# Patient Record
Sex: FEMALE | Race: WHITE | NOT HISPANIC OR LATINO
[De-identification: names, ages, dates, MRNs, and addresses within clinical notes are randomized per-mention and may not be internally consistent; named-entity substitution may affect disease eponyms.]

---

## 2017-11-28 PROBLEM — Z00.00 ENCOUNTER FOR PREVENTIVE HEALTH EXAMINATION: Status: ACTIVE | Noted: 2017-11-28

## 2018-01-03 ENCOUNTER — APPOINTMENT (OUTPATIENT)
Dept: ORTHOPEDIC SURGERY | Facility: CLINIC | Age: 73
End: 2018-01-03
Payer: MEDICARE

## 2018-01-03 VITALS
HEART RATE: 83 BPM | DIASTOLIC BLOOD PRESSURE: 81 MMHG | HEIGHT: 62 IN | BODY MASS INDEX: 27.6 KG/M2 | SYSTOLIC BLOOD PRESSURE: 169 MMHG | WEIGHT: 150 LBS

## 2018-01-03 DIAGNOSIS — M25.551 PAIN IN RIGHT HIP: ICD-10-CM

## 2018-01-03 DIAGNOSIS — M51.26 OTHER INTERVERTEBRAL DISC DISPLACEMENT, LUMBAR REGION: ICD-10-CM

## 2018-01-03 DIAGNOSIS — M54.5 LOW BACK PAIN: ICD-10-CM

## 2018-01-03 PROCEDURE — 72170 X-RAY EXAM OF PELVIS: CPT

## 2018-01-03 PROCEDURE — 72100 X-RAY EXAM L-S SPINE 2/3 VWS: CPT

## 2018-01-03 PROCEDURE — 99202 OFFICE O/P NEW SF 15 MIN: CPT

## 2018-01-18 ENCOUNTER — APPOINTMENT (OUTPATIENT)
Dept: OBGYN | Facility: CLINIC | Age: 73
End: 2018-01-18
Payer: MEDICARE

## 2018-01-18 VITALS
WEIGHT: 148 LBS | DIASTOLIC BLOOD PRESSURE: 92 MMHG | HEIGHT: 62 IN | BODY MASS INDEX: 27.23 KG/M2 | SYSTOLIC BLOOD PRESSURE: 179 MMHG

## 2018-01-18 DIAGNOSIS — E78.5 HYPERLIPIDEMIA, UNSPECIFIED: ICD-10-CM

## 2018-01-18 DIAGNOSIS — I10 ESSENTIAL (PRIMARY) HYPERTENSION: ICD-10-CM

## 2018-01-18 PROCEDURE — G0101: CPT

## 2018-01-18 RX ORDER — AMOXICILLIN 500 MG/1
500 CAPSULE ORAL
Qty: 28 | Refills: 0 | Status: COMPLETED | COMMUNITY
Start: 2017-08-15

## 2018-01-18 RX ORDER — TRETINOIN 0.25 MG/G
0.03 CREAM TOPICAL
Qty: 45 | Refills: 0 | Status: COMPLETED | COMMUNITY
Start: 2017-12-04

## 2018-01-18 RX ORDER — CHOLECALCIFEROL (VITAMIN D3) 25 MCG
TABLET ORAL
Refills: 0 | Status: COMPLETED | COMMUNITY

## 2018-01-18 RX ORDER — DICLOFENAC SODIUM 10 MG/G
1 GEL TOPICAL
Qty: 100 | Refills: 0 | Status: ACTIVE | COMMUNITY
Start: 2017-08-09

## 2018-01-18 RX ORDER — BENZONATATE 200 MG/1
200 CAPSULE ORAL
Qty: 20 | Refills: 0 | Status: ACTIVE | COMMUNITY
Start: 2017-10-22

## 2018-01-18 RX ORDER — LOSARTAN POTASSIUM AND HYDROCHLOROTHIAZIDE 12.5; 1 MG/1; MG/1
TABLET ORAL
Refills: 0 | Status: ACTIVE | COMMUNITY

## 2018-01-18 RX ORDER — ASPIRIN 81 MG
81 TABLET, DELAYED RELEASE (ENTERIC COATED) ORAL
Refills: 0 | Status: ACTIVE | COMMUNITY

## 2018-01-18 RX ORDER — ATORVASTATIN CALCIUM 80 MG/1
TABLET, FILM COATED ORAL
Refills: 0 | Status: ACTIVE | COMMUNITY

## 2018-01-18 RX ORDER — NEOMYCIN AND POLYMYXIN B SULFATES AND DEXAMETHASONE 3.5; 10000; 1 MG/G; [IU]/G; MG/G
3.5-10000-0.1 OINTMENT OPHTHALMIC
Qty: 4 | Refills: 0 | Status: COMPLETED | COMMUNITY
Start: 2017-08-18

## 2018-01-26 LAB — CYTOLOGY CVX/VAG DOC THIN PREP: NORMAL

## 2018-02-07 ENCOUNTER — APPOINTMENT (OUTPATIENT)
Dept: UROGYNECOLOGY | Facility: CLINIC | Age: 73
End: 2018-02-07
Payer: MEDICARE

## 2018-02-07 VITALS — WEIGHT: 150 LBS | BODY MASS INDEX: 28.32 KG/M2 | HEIGHT: 61 IN | HEART RATE: 80 BPM

## 2018-02-07 DIAGNOSIS — Z78.9 OTHER SPECIFIED HEALTH STATUS: ICD-10-CM

## 2018-02-07 DIAGNOSIS — Z83.49 FAMILY HISTORY OF OTHER ENDOCRINE, NUTRITIONAL AND METABOLIC DISEASES: ICD-10-CM

## 2018-02-07 DIAGNOSIS — Z82.49 FAMILY HISTORY OF ISCHEMIC HEART DISEASE AND OTHER DISEASES OF THE CIRCULATORY SYSTEM: ICD-10-CM

## 2018-02-07 DIAGNOSIS — Z37.9 ENCOUNTER FOR FULL-TERM UNCOMPLICATED DELIVERY: ICD-10-CM

## 2018-02-07 LAB
BILIRUB UR QL STRIP: NORMAL
CLARITY UR: NORMAL
COLLECTION METHOD: NORMAL
GLUCOSE UR-MCNC: NORMAL
HCG UR QL: 0.2 EU/DL
HGB UR QL STRIP.AUTO: NORMAL
KETONES UR-MCNC: NORMAL
LEUKOCYTE ESTERASE UR QL STRIP: NORMAL
NITRITE UR QL STRIP: NORMAL
PH UR STRIP: 6
PROT UR STRIP-MCNC: NORMAL
SP GR UR STRIP: 1

## 2018-02-07 PROCEDURE — 81003 URINALYSIS AUTO W/O SCOPE: CPT | Mod: QW

## 2018-02-07 PROCEDURE — 99204 OFFICE O/P NEW MOD 45 MIN: CPT | Mod: 25

## 2018-02-07 RX ORDER — CHOLECALCIFEROL (VITAMIN D3) 25 MCG
TABLET ORAL
Refills: 0 | Status: ACTIVE | COMMUNITY

## 2018-02-08 ENCOUNTER — RESULT REVIEW (OUTPATIENT)
Age: 73
End: 2018-02-08

## 2018-02-08 LAB
APPEARANCE: CLEAR
BACTERIA: NEGATIVE
BILIRUBIN URINE: NEGATIVE
BLOOD URINE: ABNORMAL
COLOR: YELLOW
GLUCOSE QUALITATIVE U: NEGATIVE MG/DL
KETONES URINE: NEGATIVE
LEUKOCYTE ESTERASE URINE: NEGATIVE
MICROSCOPIC-UA: NORMAL
NITRITE URINE: NEGATIVE
PH URINE: 6
PROTEIN URINE: NEGATIVE MG/DL
RED BLOOD CELLS URINE: 1 /HPF
SPECIFIC GRAVITY URINE: 1.01
SQUAMOUS EPITHELIAL CELLS: 0 /HPF
UROBILINOGEN URINE: NEGATIVE MG/DL
WHITE BLOOD CELLS URINE: 0 /HPF

## 2018-02-09 ENCOUNTER — RESULT REVIEW (OUTPATIENT)
Age: 73
End: 2018-02-09

## 2018-02-09 LAB — BACTERIA UR CULT: NORMAL

## 2018-04-04 ENCOUNTER — APPOINTMENT (OUTPATIENT)
Dept: ORTHOPEDIC SURGERY | Facility: CLINIC | Age: 73
End: 2018-04-04
Payer: MEDICARE

## 2018-04-04 PROCEDURE — 99213 OFFICE O/P EST LOW 20 MIN: CPT | Mod: 25

## 2018-04-04 PROCEDURE — 20610 DRAIN/INJ JOINT/BURSA W/O US: CPT | Mod: RT

## 2018-08-13 ENCOUNTER — APPOINTMENT (OUTPATIENT)
Dept: UROGYNECOLOGY | Facility: CLINIC | Age: 73
End: 2018-08-13
Payer: MEDICARE

## 2018-08-13 DIAGNOSIS — N81.89 OTHER FEMALE GENITAL PROLAPSE: ICD-10-CM

## 2018-08-13 LAB
BILIRUB UR QL STRIP: NORMAL
CLARITY UR: CLEAR
COLLECTION METHOD: NORMAL
GLUCOSE UR-MCNC: NORMAL
HCG UR QL: 0.2 EU/DL
HGB UR QL STRIP.AUTO: NORMAL
KETONES UR-MCNC: NORMAL
LEUKOCYTE ESTERASE UR QL STRIP: NORMAL
NITRITE UR QL STRIP: NORMAL
PH UR STRIP: 5
PROT UR STRIP-MCNC: NORMAL
SP GR UR STRIP: 1

## 2018-08-13 PROCEDURE — 99214 OFFICE O/P EST MOD 30 MIN: CPT | Mod: 25

## 2018-08-13 PROCEDURE — 51701 INSERT BLADDER CATHETER: CPT

## 2018-08-13 PROCEDURE — 81003 URINALYSIS AUTO W/O SCOPE: CPT | Mod: QW

## 2018-08-14 LAB
APPEARANCE: CLEAR
BACTERIA: NEGATIVE
BILIRUBIN URINE: NEGATIVE
BLOOD URINE: ABNORMAL
COLOR: YELLOW
GLUCOSE QUALITATIVE U: NEGATIVE MG/DL
KETONES URINE: NEGATIVE
LEUKOCYTE ESTERASE URINE: NEGATIVE
MICROSCOPIC-UA: NORMAL
NITRITE URINE: NEGATIVE
PH URINE: 5.5
PROTEIN URINE: NEGATIVE MG/DL
RED BLOOD CELLS URINE: 0 /HPF
SPECIFIC GRAVITY URINE: 1.01
SQUAMOUS EPITHELIAL CELLS: 0 /HPF
UROBILINOGEN URINE: NEGATIVE MG/DL
WHITE BLOOD CELLS URINE: 0 /HPF

## 2018-08-15 ENCOUNTER — RESULT REVIEW (OUTPATIENT)
Age: 73
End: 2018-08-15

## 2018-08-15 LAB — BACTERIA UR CULT: NORMAL

## 2018-10-15 ENCOUNTER — APPOINTMENT (OUTPATIENT)
Dept: UROGYNECOLOGY | Facility: CLINIC | Age: 73
End: 2018-10-15

## 2019-09-16 ENCOUNTER — APPOINTMENT (OUTPATIENT)
Dept: UROGYNECOLOGY | Facility: CLINIC | Age: 74
End: 2019-09-16
Payer: MEDICARE

## 2019-09-16 LAB
BILIRUB UR QL STRIP: NORMAL
CLARITY UR: CLEAR
COLLECTION METHOD: NORMAL
GLUCOSE UR-MCNC: NORMAL
HCG UR QL: 0.2 EU/DL
HGB UR QL STRIP.AUTO: NORMAL
KETONES UR-MCNC: NORMAL
LEUKOCYTE ESTERASE UR QL STRIP: NORMAL
NITRITE UR QL STRIP: NORMAL
PH UR STRIP: 5
PROT UR STRIP-MCNC: NORMAL
SP GR UR STRIP: 1.01

## 2019-09-16 PROCEDURE — 51701 INSERT BLADDER CATHETER: CPT

## 2019-09-16 PROCEDURE — 81003 URINALYSIS AUTO W/O SCOPE: CPT | Mod: NC,QW

## 2019-09-16 PROCEDURE — 99214 OFFICE O/P EST MOD 30 MIN: CPT | Mod: 25

## 2019-09-16 NOTE — DISCUSSION/SUMMARY
[FreeTextEntry1] : I reviewed the above findings with the patient and her  with visual illustrations. For the postmenopausal bleeding we discussed going for a pelvic ultrasound. Treatment options for the prolapse were discussed and included doing nothing, Kegel exercises and behavioral modification, a pessary, or surgical correction.Surgically we discussed the abdominal vs the vaginal routes. Abdominally we discussed a hysterectomy and a sacral colpopexy   laparoscopically and robotically.  Vaginally we discussed a vaginal hysterectomy, uterosacral suspension, and anterior/posterior repair. Surgically we discussed hysteropexy as well as the use of biologics. She is interested in surgical correction and have asked her to return for urodynamic testing to further evaluate her urinary complaints. We will further discuss treatment options after her evaluation is completed. All questions were answered. IUGA patient information on pelvic organ prolapse was given to her. \par

## 2019-09-16 NOTE — HISTORY OF PRESENT ILLNESS
[Uterine Prolapse] : daily [Rectal Prolapse] : daily [Unable To Restrain Bowel Movement] : rare [x1] : once nightly [Urinary Frequency] : none [Urinary Tract Infection] : daily [FreeTextEntry4] : see below [FreeTextEntry1] : pt had an episode of vaginal bleeding 4 days ago.  \par pt has been doing Kegels daily.  \par she would also like to discuss tx options for POP.

## 2019-09-16 NOTE — PHYSICAL EXAM
[No Acute Distress] : in no acute distress [Well developed] : well developed [Well Nourished] : ~L well nourished [Oriented x3] : oriented to person, place, and time [Soft, Nontender] : the abdomen was soft and nontender [Warm and Dry] : was warm and dry to touch [Normal Appearance] : general appearance was normal [Atrophy] : atrophy [Cystocele] : a cystocele [Uterine Prolapse] : uterine prolapse [Normal] : normal [Uterine Adnexae] : were not tender and not enlarged [Normal rectal exam] : was normal [Post Void Residual ____ml] : post void residual via catheterization was [unfilled] ml [de-identified] : no change from 8/13/2018

## 2019-09-17 ENCOUNTER — RESULT REVIEW (OUTPATIENT)
Age: 74
End: 2019-09-17

## 2019-09-17 LAB
APPEARANCE: CLEAR
BACTERIA: NEGATIVE
BILIRUBIN URINE: NEGATIVE
BLOOD URINE: ABNORMAL
COLOR: NORMAL
GLUCOSE QUALITATIVE U: NEGATIVE
HYALINE CASTS: 0 /LPF
KETONES URINE: NEGATIVE
LEUKOCYTE ESTERASE URINE: NEGATIVE
MICROSCOPIC-UA: NORMAL
NITRITE URINE: NEGATIVE
PH URINE: 5.5
PROTEIN URINE: NEGATIVE
RED BLOOD CELLS URINE: 1 /HPF
SPECIFIC GRAVITY URINE: 1.01
SQUAMOUS EPITHELIAL CELLS: 0 /HPF
UROBILINOGEN URINE: NORMAL
WHITE BLOOD CELLS URINE: 0 /HPF

## 2019-09-18 ENCOUNTER — RESULT REVIEW (OUTPATIENT)
Age: 74
End: 2019-09-18

## 2019-09-18 LAB — BACTERIA UR CULT: NORMAL

## 2019-09-24 ENCOUNTER — FORM ENCOUNTER (OUTPATIENT)
Age: 74
End: 2019-09-24

## 2019-09-25 ENCOUNTER — MESSAGE (OUTPATIENT)
Age: 74
End: 2019-09-25

## 2019-09-25 ENCOUNTER — APPOINTMENT (OUTPATIENT)
Dept: ULTRASOUND IMAGING | Facility: CLINIC | Age: 74
End: 2019-09-25
Payer: MEDICARE

## 2019-09-25 ENCOUNTER — OUTPATIENT (OUTPATIENT)
Dept: OUTPATIENT SERVICES | Facility: HOSPITAL | Age: 74
LOS: 1 days | End: 2019-09-25
Payer: MEDICARE

## 2019-09-25 DIAGNOSIS — N95.0 POSTMENOPAUSAL BLEEDING: ICD-10-CM

## 2019-09-25 PROCEDURE — 76830 TRANSVAGINAL US NON-OB: CPT

## 2019-09-25 PROCEDURE — 76830 TRANSVAGINAL US NON-OB: CPT | Mod: 26

## 2019-09-30 ENCOUNTER — APPOINTMENT (OUTPATIENT)
Dept: UROGYNECOLOGY | Facility: CLINIC | Age: 74
End: 2019-09-30
Payer: MEDICARE

## 2019-09-30 ENCOUNTER — OUTPATIENT (OUTPATIENT)
Dept: OUTPATIENT SERVICES | Facility: HOSPITAL | Age: 74
LOS: 1 days | End: 2019-09-30
Payer: MEDICARE

## 2019-09-30 DIAGNOSIS — Z01.818 ENCOUNTER FOR OTHER PREPROCEDURAL EXAMINATION: ICD-10-CM

## 2019-09-30 PROCEDURE — 51797 INTRAABDOMINAL PRESSURE TEST: CPT

## 2019-09-30 PROCEDURE — 51729 CYSTOMETROGRAM W/VP&UP: CPT

## 2019-09-30 PROCEDURE — 51797 INTRAABDOMINAL PRESSURE TEST: CPT | Mod: 26

## 2019-09-30 PROCEDURE — 51784 ANAL/URINARY MUSCLE STUDY: CPT

## 2019-09-30 PROCEDURE — 51784 ANAL/URINARY MUSCLE STUDY: CPT | Mod: 26

## 2019-09-30 PROCEDURE — 51729 CYSTOMETROGRAM W/VP&UP: CPT | Mod: 26

## 2019-10-07 ENCOUNTER — APPOINTMENT (OUTPATIENT)
Dept: UROGYNECOLOGY | Facility: CLINIC | Age: 74
End: 2019-10-07
Payer: MEDICARE

## 2019-10-07 PROCEDURE — 99214 OFFICE O/P EST MOD 30 MIN: CPT

## 2019-10-07 NOTE — HISTORY OF PRESENT ILLNESS
[FreeTextEntry1] : Patient returns today with her  for followup on her pelvic organ prolapse and urinary incontinence. Her chart was reviewed. Review of symptoms was unchanged from her visit dated September 16, 2019. She does not experience episodes of stress incontinence. On her last visit she had a pop to stage II pelvic organ prolapse with uterovaginal prolapse and cystocele. She underwent urodynamic testing in September which revealed occult stress urinary incontinence. She had a pelvic ultrasound in September which revealed a uterus 6.4 x 4.4 x 4.9 cm with a 2 cm fibroid endometrial lining was 1 mm ovaries were normal. I reviewed these findings with them. I reviewed the above findings with the patient with visual illustrations. Treatment options for the prolapse were discussed and included doing nothing, Kegel exercises and behavioral modification, a pessary, or surgical correction.Surgically we discussed the abdominal vs the vaginal routes. Abdominally we discussed a hysterectomy and a sacral colpopexy   laparoscopically and robotically.  Vaginally we discussed a vaginal hysterectomy, uterosacral suspension, and anterior/posterior repair. Surgically we discussed hysteropexy as well. We discussed the occult stress incontinence as well as surgical and nonsurgical treatment options and the placement of a mid urethral sling at the time of surgical correction. She wishes to proceed with a needed tissue repair vaginally and we discussed a vaginal hysterectomy, uterosacral suspension, anterior and possible posterior repair and I UG date patient information on such was given to her. She does not wish to proceed with a mid urethral sling at the time of surgical correction she understands that there is a possibility of her developing symptomatic stress urinary incontinence postoperatively for which she can request treatment including surgical correction. We discussed hospital stay. We discussed the possibility of going home with a catheter a failure. All questions were answered and she wishes to schedule surgery for some time after the holidays in December or early January. All questions were answered\par

## 2019-12-13 ENCOUNTER — APPOINTMENT (OUTPATIENT)
Dept: ORTHOPEDIC SURGERY | Facility: CLINIC | Age: 74
End: 2019-12-13
Payer: MEDICARE

## 2019-12-13 VITALS — WEIGHT: 150 LBS | BODY MASS INDEX: 28.32 KG/M2 | HEIGHT: 61 IN

## 2019-12-13 DIAGNOSIS — M70.61 TROCHANTERIC BURSITIS, RIGHT HIP: ICD-10-CM

## 2019-12-13 DIAGNOSIS — Z96.641 PRESENCE OF RIGHT ARTIFICIAL HIP JOINT: ICD-10-CM

## 2019-12-13 PROCEDURE — 72170 X-RAY EXAM OF PELVIS: CPT

## 2019-12-13 PROCEDURE — 20610 DRAIN/INJ JOINT/BURSA W/O US: CPT | Mod: RT

## 2019-12-13 PROCEDURE — 99213 OFFICE O/P EST LOW 20 MIN: CPT | Mod: 25

## 2019-12-13 NOTE — PHYSICAL EXAM
[de-identified] : An AP of the pelvis with a lateral of the patient's right hip shows a right Biomet all porous total hip replacement.  It is in good position.  It is well fixed.  There is no evidence of ostial lysis by x-ray.  There is no calcium over the greater trochanter but this does not rule out trochanteric bursitis.  The pelvis and a lateral of the right hip shows a Biomet porous hip replacement in good position and well fixed. There is no evidence of any localized osteolysis. There is no evidence of her by x-ray.\par \par  [FreeTextEntry2] : \par \par HIP EXAMINATION\par Is symmetric she has flexion of 135 abduction 70 adduction 30 external rotation's 65 internal rotation 20 and full extension.  She does have pain directly over her right greater trochanter the clinically this is consistent with trochanteric bursitis.  It hurts her when she lays on it hurts when she is walking.\par

## 2019-12-13 NOTE — DISCUSSION/SUMMARY
[de-identified] : Her hip is doing very well.  She is walking well is no problem as far as her total hip replacement she does have pain over the greater trochanter which is consistent with trochanteric bursitis.  Following the local injection the patient had no pain no pain when laying on it no pain with palpation over the greater trochanter she will return in 3 months PRN for her trochanteric bursitis if it recurs otherwise a year.

## 2019-12-13 NOTE — HISTORY OF PRESENT ILLNESS
[de-identified] : Pt is a 75 y/o female s/p R THR 11-12 years ago. \par Patient received right hip GT bursa injection when last in office April 2018, which helped for over 1-1/2 years.\par She now comes back again and is having some pain directly over the right greater trochanter. This started gradually over the last month.\par Pain felt mostly with getting up from a seated position. Pain relieved mildly to moderately with Aleve.

## 2020-01-06 ENCOUNTER — APPOINTMENT (OUTPATIENT)
Dept: UROGYNECOLOGY | Facility: CLINIC | Age: 75
End: 2020-01-06
Payer: MEDICARE

## 2020-01-06 PROCEDURE — 51701 INSERT BLADDER CATHETER: CPT

## 2020-01-06 PROCEDURE — 99214 OFFICE O/P EST MOD 30 MIN: CPT | Mod: 25

## 2020-01-06 NOTE — PROCEDURE
[FreeTextEntry1] : A catheterized urine was performed to rule out urinary tract infection as due to prolapse  patient is unlikely to be able to give an adequate clean-catch specimen.\par

## 2020-01-06 NOTE — PHYSICAL EXAM
[No Acute Distress] : in no acute distress [Well developed] : well developed [Well Nourished] : ~L well nourished [Normal Mood/Affect] : mood and affect are normal [Soft, Nontender] : the abdomen was soft and nontender [Vulvar Atrophy] : vulvar atrophy [Normal Appearance] : general appearance was normal [Cystocele] : a cystocele [Atrophy] : atrophy [Uterine Prolapse] : uterine prolapse [Normal] : normal [Uterine Adnexae] : were not tender and not enlarged [Anxiety] : patient is not anxious [de-identified] : 2mm right labial skin tag [de-identified] : no change

## 2020-01-06 NOTE — HISTORY OF PRESENT ILLNESS
[FreeTextEntry1] : Patient returns today with her  for followup on her pelvic organ prolapse and urinary incontinence. Her chart was reviewed. Review of symptoms was unchanged from her visit dated September 16, 2019. On her last visit she had a pop to stage II pelvic organ prolapse with uterovaginal prolapse and cystocele. She underwent urodynamic testing in September which revealed occult stress urinary incontinence. She had a pelvic ultrasound in September which revealed a uterus 6.4 x 4.4 x 4.9 cm with a 2 cm fibroid, endometrial lining was 1 mm ovaries were normal.\par She has a right labial skin tag which has been bothersome and would like removed.

## 2020-01-08 ENCOUNTER — RESULT REVIEW (OUTPATIENT)
Age: 75
End: 2020-01-08

## 2020-01-08 LAB — BACTERIA UR CULT: NORMAL

## 2020-01-15 ENCOUNTER — OUTPATIENT (OUTPATIENT)
Dept: OUTPATIENT SERVICES | Facility: HOSPITAL | Age: 75
LOS: 1 days | End: 2020-01-15
Payer: MEDICARE

## 2020-01-15 VITALS
RESPIRATION RATE: 16 BRPM | TEMPERATURE: 99 F | DIASTOLIC BLOOD PRESSURE: 87 MMHG | HEIGHT: 61 IN | OXYGEN SATURATION: 97 % | SYSTOLIC BLOOD PRESSURE: 157 MMHG | HEART RATE: 68 BPM | WEIGHT: 134.92 LBS

## 2020-01-15 DIAGNOSIS — N81.11 CYSTOCELE, MIDLINE: ICD-10-CM

## 2020-01-15 DIAGNOSIS — N81.2 INCOMPLETE UTEROVAGINAL PROLAPSE: ICD-10-CM

## 2020-01-15 DIAGNOSIS — Z98.41 CATARACT EXTRACTION STATUS, RIGHT EYE: Chronic | ICD-10-CM

## 2020-01-15 DIAGNOSIS — Z01.818 ENCOUNTER FOR OTHER PREPROCEDURAL EXAMINATION: ICD-10-CM

## 2020-01-15 DIAGNOSIS — I10 ESSENTIAL (PRIMARY) HYPERTENSION: ICD-10-CM

## 2020-01-15 DIAGNOSIS — Z98.890 OTHER SPECIFIED POSTPROCEDURAL STATES: Chronic | ICD-10-CM

## 2020-01-15 DIAGNOSIS — Z96.641 PRESENCE OF RIGHT ARTIFICIAL HIP JOINT: Chronic | ICD-10-CM

## 2020-01-15 DIAGNOSIS — Z29.9 ENCOUNTER FOR PROPHYLACTIC MEASURES, UNSPECIFIED: ICD-10-CM

## 2020-01-15 LAB
ANION GAP SERPL CALC-SCNC: 15 MMOL/L — SIGNIFICANT CHANGE UP (ref 5–17)
BLD GP AB SCN SERPL QL: NEGATIVE — SIGNIFICANT CHANGE UP
BUN SERPL-MCNC: 19 MG/DL — SIGNIFICANT CHANGE UP (ref 7–23)
CALCIUM SERPL-MCNC: 9.8 MG/DL — SIGNIFICANT CHANGE UP (ref 8.4–10.5)
CHLORIDE SERPL-SCNC: 100 MMOL/L — SIGNIFICANT CHANGE UP (ref 96–108)
CO2 SERPL-SCNC: 27 MMOL/L — SIGNIFICANT CHANGE UP (ref 22–31)
CREAT SERPL-MCNC: 0.93 MG/DL — SIGNIFICANT CHANGE UP (ref 0.5–1.3)
GLUCOSE SERPL-MCNC: 93 MG/DL — SIGNIFICANT CHANGE UP (ref 70–99)
HBA1C BLD-MCNC: 5.7 % — HIGH (ref 4–5.6)
HCT VFR BLD CALC: 45.3 % — HIGH (ref 34.5–45)
HGB BLD-MCNC: 14.2 G/DL — SIGNIFICANT CHANGE UP (ref 11.5–15.5)
MCHC RBC-ENTMCNC: 30.1 PG — SIGNIFICANT CHANGE UP (ref 27–34)
MCHC RBC-ENTMCNC: 31.3 GM/DL — LOW (ref 32–36)
MCV RBC AUTO: 96 FL — SIGNIFICANT CHANGE UP (ref 80–100)
PLATELET # BLD AUTO: 237 K/UL — SIGNIFICANT CHANGE UP (ref 150–400)
POTASSIUM SERPL-MCNC: 3.9 MMOL/L — SIGNIFICANT CHANGE UP (ref 3.5–5.3)
POTASSIUM SERPL-SCNC: 3.9 MMOL/L — SIGNIFICANT CHANGE UP (ref 3.5–5.3)
RBC # BLD: 4.72 M/UL — SIGNIFICANT CHANGE UP (ref 3.8–5.2)
RBC # FLD: 12.8 % — SIGNIFICANT CHANGE UP (ref 10.3–14.5)
RH IG SCN BLD-IMP: POSITIVE — SIGNIFICANT CHANGE UP
SODIUM SERPL-SCNC: 142 MMOL/L — SIGNIFICANT CHANGE UP (ref 135–145)
WBC # BLD: 6.46 K/UL — SIGNIFICANT CHANGE UP (ref 3.8–10.5)
WBC # FLD AUTO: 6.46 K/UL — SIGNIFICANT CHANGE UP (ref 3.8–10.5)

## 2020-01-15 PROCEDURE — 85027 COMPLETE CBC AUTOMATED: CPT

## 2020-01-15 PROCEDURE — 80048 BASIC METABOLIC PNL TOTAL CA: CPT

## 2020-01-15 PROCEDURE — 86901 BLOOD TYPING SEROLOGIC RH(D): CPT

## 2020-01-15 PROCEDURE — 86900 BLOOD TYPING SEROLOGIC ABO: CPT

## 2020-01-15 PROCEDURE — 83036 HEMOGLOBIN GLYCOSYLATED A1C: CPT

## 2020-01-15 PROCEDURE — G0463: CPT

## 2020-01-15 PROCEDURE — 86850 RBC ANTIBODY SCREEN: CPT

## 2020-01-15 RX ORDER — CEFOTETAN DISODIUM 1 G
2 VIAL (EA) INJECTION ONCE
Refills: 0 | Status: DISCONTINUED | OUTPATIENT
Start: 2020-01-28 | End: 2020-01-29

## 2020-01-15 NOTE — H&P PST ADULT - NEGATIVE CARDIOVASCULAR SYMPTOMS
no chest pain/no orthopnea/no palpitations/no dyspnea on exertion/no paroxysmal nocturnal dyspnea/no peripheral edema

## 2020-01-15 NOTE — H&P PST ADULT - NSICDXPROBLEM_GEN_ALL_CORE_FT
PROBLEM DIAGNOSES  Problem: Incomplete uterovaginal prolapse  Assessment and Plan: Cystoscopy, anterior and posterior repair with enterocele, total vaginal hysterectomy  , uterosacral suspension  check CBC, BMP, HgBA1c, T&S  Continue ASA as ordered by PMD    Problem: HTN (hypertension)  Assessment and Plan: Continue meds as ordered     Problem: Prophylactic measure  Assessment and Plan: The Caprini score indicates this patient is at risk for a VTE event (score 3-5).  Most surgical patients in this group would benefit from pharmacologic prophylaxis.  The surgical team will determine the balance between VTE risk and bleeding risk

## 2020-01-15 NOTE — H&P PST ADULT - NSICDXPASTSURGICALHX_GEN_ALL_CORE_FT
PAST SURGICAL HISTORY:  H/O meniscectomy of right knee     S/P bilateral cataract extraction     S/P hip replacement, right 3/9/07

## 2020-01-15 NOTE — H&P PST ADULT - NEGATIVE NEUROLOGICAL SYMPTOMS
no syncope/no paresthesias/no vertigo/no headache/no facial palsy/no hemiparesis/no focal seizures/no tremors/no confusion/no loss of consciousness/no loss of sensation/no weakness/no generalized seizures

## 2020-01-15 NOTE — H&P PST ADULT - NEGATIVE GENERAL GENITOURINARY SYMPTOMS
no flank pain L/no hematuria/no renal colic/no dysuria/no incontinence/no flank pain R/no bladder infections

## 2020-01-15 NOTE — H&P PST ADULT - NSICDXPASTMEDICALHX_GEN_ALL_CORE_FT
PAST MEDICAL HISTORY:  Displacement of lumbar intervertebral disc     Female stress incontinence     Greater trochanteric bursitis, right     History of uterine fibroid     HLD (hyperlipidemia)     HTN (hypertension)     Incomplete uterovaginal prolapse     Midline cystocele     Pelvic floor weakness     Urethral hypermobility     Urinary urgency

## 2020-01-15 NOTE — H&P PST ADULT - NSANTHOSAYNRD_GEN_A_CORE
No. POLLY screening performed.  STOP BANG Legend: 0-2 = LOW Risk; 3-4 = INTERMEDIATE Risk; 5-8 = HIGH Risk

## 2020-01-15 NOTE — H&P PST ADULT - HISTORY OF PRESENT ILLNESS
73 Y/O Female H/O HTN, HLD, OA, Incomplete uterovaginal prolapse, cystocele midline. C/O vaginal bulge.  S/P vaginal ultrasound. Presents cystoscopy, anterior and posterior repair with enterocele, total vaginal hysterectomy , uterosacral suspension 1/28/20. 73 Y/O Female H/O HTN, HLD, OA, Incomplete uterovaginal prolapse, cystocele midline, uterine fibrois. C/O vaginal bulge, stress incontinence, urinary urgency.   Seen by Dr Plunkett. S/P Pelvic ultrasound. Presents cystoscopy, anterior and posterior repair with enterocele, total vaginal hysterectomy , uterosacral suspension 1/28/20. 75 Y/O Female H/O HTN, HLD, OA, Incomplete uterovaginal prolapse, cystocele midline, uterine fibroid. C/O vaginal bulge, stress incontinence, urinary urgency.   Seen by Dr Plunkett. S/P Pelvic ultrasound. Presents cystoscopy, anterior and posterior repair with enterocele, total vaginal hysterectomy , uterosacral suspension 1/28/20.

## 2020-01-15 NOTE — H&P PST ADULT - ASSESSMENT
JINI VTE 2.0 SCORE [CLOT updated 2019]    AGE RELATED RISK FACTORS                                                       MOBILITY RELATED FACTORS  [ ] Age 41-60 years                                            (1 Point)                    [ ] Bed rest                                                        (1 Point)  x[ ] Age: 61-74 years                                           (2 Points)                  [ ] Plaster cast                                                   (2 Points)  [ ] Age= 75 years                                              (3 Points)                    [ ] Bed bound for more than 72 hours                 (2 Points)    DISEASE RELATED RISK FACTORS                                               GENDER SPECIFIC FACTORS  [ ] Edema in the lower extremities                       (1 Point)              [ ] Pregnancy                                                     (1 Point)  [ ] Varicose veins                                               (1 Point)                     [ ] Post-partum < 6 weeks                                   (1 Point)             [x ] BMI > 25 Kg/m2                                            (1 Point)                     [ ] Hormonal therapy  or oral contraception          (1 Point)                 [ ] Sepsis (in the previous month)                        (1 Point)               [ ] History of pregnancy complications                 (1 point)  [ ] Pneumonia or serious lung disease                                               [ ] Unexplained or recurrent                     (1 Point)           (in the previous month)                               (1 Point)  [ ] Abnormal pulmonary function test                     (1 Point)                 SURGERY RELATED RISK FACTORS  [ ] Acute myocardial infarction                              (1 Point)               [ ]  Section                                             (1 Point)  [ ] Congestive heart failure (in the previous month)  (1 Point)      [ ] Minor surgery                                                  (1 Point)   [ ] Inflammatory bowel disease                             (1 Point)               [ ] Arthroscopic surgery                                        (2 Points)  [ ] Central venous access                                      (2 Points)                [x ] General surgery lasting more than 45 minutes (2 points)  [ ] Malignancy- Present or previous                   (2 Points)                [ ] Elective arthroplasty                                         (5 points)    [ ] Stroke (in the previous month)                          (5 Points)                                                                                                                                                           HEMATOLOGY RELATED FACTORS                                                 TRAUMA RELATED RISK FACTORS  [ ] Prior episodes of VTE                                     (3 Points)                [ ] Fracture of the hip, pelvis, or leg                       (5 Points)  [ ] Positive family history for VTE                         (3 Points)             [ ] Acute spinal cord injury (in the previous month)  (5 Points)  [ ] Prothrombin 31347 A                                     (3 Points)               [ ] Paralysis  (less than 1 month)                             (5 Points)  [ ] Factor V Leiden                                             (3 Points)                  [ ] Multiple Trauma within 1 month                        (5 Points)  [ ] Lupus anticoagulants                                     (3 Points)                                                           [ ] Anticardiolipin antibodies                               (3 Points)                                                       [ ] High homocysteine in the blood                      (3 Points)                                             [ ] Other congenital or acquired thrombophilia      (3 Points)                                                [ ] Heparin induced thrombocytopenia                  (3 Points)                                     Total Score [    5      ]

## 2020-01-27 ENCOUNTER — TRANSCRIPTION ENCOUNTER (OUTPATIENT)
Age: 75
End: 2020-01-27

## 2020-01-28 ENCOUNTER — INPATIENT (INPATIENT)
Facility: HOSPITAL | Age: 75
LOS: 0 days | Discharge: HOME | DRG: 743 | End: 2020-01-29
Attending: UROLOGY | Admitting: UROLOGY
Payer: MEDICARE

## 2020-01-28 ENCOUNTER — TRANSCRIPTION ENCOUNTER (OUTPATIENT)
Age: 75
End: 2020-01-28

## 2020-01-28 ENCOUNTER — APPOINTMENT (OUTPATIENT)
Dept: UROGYNECOLOGY | Facility: HOSPITAL | Age: 75
End: 2020-01-28
Payer: MEDICARE

## 2020-01-28 VITALS
DIASTOLIC BLOOD PRESSURE: 94 MMHG | HEIGHT: 61 IN | RESPIRATION RATE: 18 BRPM | HEART RATE: 77 BPM | TEMPERATURE: 98 F | OXYGEN SATURATION: 96 % | SYSTOLIC BLOOD PRESSURE: 164 MMHG | WEIGHT: 138.01 LBS

## 2020-01-28 DIAGNOSIS — Z98.41 CATARACT EXTRACTION STATUS, RIGHT EYE: Chronic | ICD-10-CM

## 2020-01-28 DIAGNOSIS — Z98.890 OTHER SPECIFIED POSTPROCEDURAL STATES: Chronic | ICD-10-CM

## 2020-01-28 DIAGNOSIS — Z96.641 PRESENCE OF RIGHT ARTIFICIAL HIP JOINT: Chronic | ICD-10-CM

## 2020-01-28 DIAGNOSIS — N81.11 CYSTOCELE, MIDLINE: ICD-10-CM

## 2020-01-28 DIAGNOSIS — N81.2 INCOMPLETE UTEROVAGINAL PROLAPSE: ICD-10-CM

## 2020-01-28 LAB — GLUCOSE BLDC GLUCOMTR-MCNC: 93 MG/DL — SIGNIFICANT CHANGE UP (ref 70–99)

## 2020-01-28 PROCEDURE — 88305 TISSUE EXAM BY PATHOLOGIST: CPT | Mod: 26

## 2020-01-28 PROCEDURE — 58260 VAGINAL HYSTERECTOMY: CPT

## 2020-01-28 PROCEDURE — 57283 COLPOPEXY INTRAPERITONEAL: CPT | Mod: 59

## 2020-01-28 PROCEDURE — 57240 ANTERIOR COLPORRHAPHY: CPT

## 2020-01-28 PROCEDURE — 88302 TISSUE EXAM BY PATHOLOGIST: CPT | Mod: 26

## 2020-01-28 RX ORDER — LOSARTAN/HYDROCHLOROTHIAZIDE 100MG-25MG
1 TABLET ORAL
Qty: 0 | Refills: 0 | DISCHARGE

## 2020-01-28 RX ORDER — ACETAMINOPHEN 500 MG
975 TABLET ORAL EVERY 6 HOURS
Refills: 0 | Status: DISCONTINUED | OUTPATIENT
Start: 2020-01-28 | End: 2020-01-29

## 2020-01-28 RX ORDER — LIDOCAINE HCL 20 MG/ML
0.2 VIAL (ML) INJECTION ONCE
Refills: 0 | Status: DISCONTINUED | OUTPATIENT
Start: 2020-01-28 | End: 2020-01-28

## 2020-01-28 RX ORDER — ATORVASTATIN CALCIUM 80 MG/1
10 TABLET, FILM COATED ORAL DAILY
Refills: 0 | Status: DISCONTINUED | OUTPATIENT
Start: 2020-01-28 | End: 2020-01-29

## 2020-01-28 RX ORDER — HYDROMORPHONE HYDROCHLORIDE 2 MG/ML
0.5 INJECTION INTRAMUSCULAR; INTRAVENOUS; SUBCUTANEOUS
Refills: 0 | Status: DISCONTINUED | OUTPATIENT
Start: 2020-01-28 | End: 2020-01-28

## 2020-01-28 RX ORDER — SODIUM CHLORIDE 9 MG/ML
3 INJECTION INTRAMUSCULAR; INTRAVENOUS; SUBCUTANEOUS EVERY 8 HOURS
Refills: 0 | Status: DISCONTINUED | OUTPATIENT
Start: 2020-01-28 | End: 2020-01-28

## 2020-01-28 RX ORDER — LOSARTAN POTASSIUM 100 MG/1
50 TABLET, FILM COATED ORAL DAILY
Refills: 0 | Status: DISCONTINUED | OUTPATIENT
Start: 2020-01-28 | End: 2020-01-29

## 2020-01-28 RX ORDER — IBUPROFEN 200 MG
3 TABLET ORAL
Qty: 0 | Refills: 0 | DISCHARGE

## 2020-01-28 RX ORDER — CHOLECALCIFEROL (VITAMIN D3) 125 MCG
1 CAPSULE ORAL
Qty: 0 | Refills: 0 | DISCHARGE

## 2020-01-28 RX ORDER — KETOROLAC TROMETHAMINE 30 MG/ML
15 SYRINGE (ML) INJECTION EVERY 8 HOURS
Refills: 0 | Status: DISCONTINUED | OUTPATIENT
Start: 2020-01-28 | End: 2020-01-29

## 2020-01-28 RX ORDER — INFLUENZA VIRUS VACCINE 15; 15; 15; 15 UG/.5ML; UG/.5ML; UG/.5ML; UG/.5ML
0.5 SUSPENSION INTRAMUSCULAR ONCE
Refills: 0 | Status: COMPLETED | OUTPATIENT
Start: 2020-01-28 | End: 2020-01-28

## 2020-01-28 RX ORDER — ASPIRIN/CALCIUM CARB/MAGNESIUM 324 MG
1 TABLET ORAL
Qty: 0 | Refills: 0 | DISCHARGE

## 2020-01-28 RX ORDER — SODIUM CHLORIDE 9 MG/ML
1000 INJECTION, SOLUTION INTRAVENOUS
Refills: 0 | Status: DISCONTINUED | OUTPATIENT
Start: 2020-01-28 | End: 2020-01-29

## 2020-01-28 RX ORDER — OXYCODONE HYDROCHLORIDE 5 MG/1
1 TABLET ORAL
Qty: 8 | Refills: 0
Start: 2020-01-28 | End: 2020-01-29

## 2020-01-28 RX ORDER — AMLODIPINE BESYLATE 2.5 MG/1
1 TABLET ORAL
Qty: 0 | Refills: 0 | DISCHARGE

## 2020-01-28 RX ORDER — ACETAMINOPHEN 500 MG
3 TABLET ORAL
Qty: 0 | Refills: 0 | DISCHARGE
Start: 2020-01-28

## 2020-01-28 RX ORDER — ATORVASTATIN CALCIUM 80 MG/1
1 TABLET, FILM COATED ORAL
Qty: 0 | Refills: 0 | DISCHARGE

## 2020-01-28 RX ORDER — AMLODIPINE BESYLATE 2.5 MG/1
2.5 TABLET ORAL DAILY
Refills: 0 | Status: DISCONTINUED | OUTPATIENT
Start: 2020-01-28 | End: 2020-01-29

## 2020-01-28 RX ORDER — OXYCODONE HYDROCHLORIDE 5 MG/1
5 TABLET ORAL EVERY 4 HOURS
Refills: 0 | Status: DISCONTINUED | OUTPATIENT
Start: 2020-01-28 | End: 2020-01-29

## 2020-01-28 RX ORDER — HYDROCHLOROTHIAZIDE 25 MG
12.5 TABLET ORAL DAILY
Refills: 0 | Status: DISCONTINUED | OUTPATIENT
Start: 2020-01-28 | End: 2020-01-29

## 2020-01-28 RX ADMIN — AMLODIPINE BESYLATE 2.5 MILLIGRAM(S): 2.5 TABLET ORAL at 15:10

## 2020-01-28 RX ADMIN — HYDROMORPHONE HYDROCHLORIDE 0.5 MILLIGRAM(S): 2 INJECTION INTRAMUSCULAR; INTRAVENOUS; SUBCUTANEOUS at 13:00

## 2020-01-28 RX ADMIN — Medication 15 MILLIGRAM(S): at 21:30

## 2020-01-28 RX ADMIN — HYDROMORPHONE HYDROCHLORIDE 0.5 MILLIGRAM(S): 2 INJECTION INTRAMUSCULAR; INTRAVENOUS; SUBCUTANEOUS at 13:15

## 2020-01-28 RX ADMIN — Medication 15 MILLIGRAM(S): at 12:44

## 2020-01-28 RX ADMIN — Medication 15 MILLIGRAM(S): at 12:59

## 2020-01-28 RX ADMIN — SODIUM CHLORIDE 75 MILLILITER(S): 9 INJECTION, SOLUTION INTRAVENOUS at 12:45

## 2020-01-28 RX ADMIN — Medication 15 MILLIGRAM(S): at 20:46

## 2020-01-28 NOTE — CHART NOTE - NSCHARTNOTEFT_GEN_A_CORE
GYN POST-OP CHECK  JUSTYN ODEN    1945Allergies    No Known Allergies    Intolerances        S: Pt awake and alert resting comfortably in bed with family at bedside.  Pain well controlled although patient does not sharp pain near the melendez when she sits straight, which she attributes to the vaginal packing. Pt denies N/V, SOB, CP, palpitations. Tolerates clears.  Not OOB yet.    O:   T(C): 36.3 (01-28-20 @ 13:45), Max: 36.3 (01-28-20 @ 13:00)  HR: 73 (01-28-20 @ 14:00) (65 - 73)  BP: 139/71 (01-28-20 @ 14:00) (137/68 - 156/76)  RR: 18 (01-28-20 @ 14:00) (18 - 20)  SpO2: 99% (01-28-20 @ 14:00) (95% - 100%)    I&O's Summary    28 Jan 2020 07:01  -  28 Jan 2020 16:30  --------------------------------------------------------  IN: 400 mL / OUT: 225 mL / NET: 175 mL      Gen: NAD. A+Ox3.  CV: S1S2, RRR  Lungs: CTA B/L  Abd: +BS. soft, non-distended and appropriately tender.  : Melendez in place draining clear yellow urine. Vaginal packing tied to melendez, not saturated.  Ext: PAS in place b/l and functioning.    · Operative Findings  STage 2 prolapse, 8cm fibroid uterus, dense adhsions between small bowel and posterior cul de sac, normal bladder mucosa, UOs patent      A/P: 74y Female  w/ pmhx  History of uterine fibroid  Pelvic floor weakness  Urinary urgency  Greater trochanteric bursitis, right  HLD (hyperlipidemia)  Female stress incontinence  Displacement of lumbar intervertebral disc  Urethral hypermobility  Midline cystocele  HTN (hypertension)  Incomplete uterovaginal prolapse  H/O meniscectomy of right knee  S/P hip replacement, right: 3/9/07  S/P bilateral cataract extraction   now POD#0 s/p TVH w/ myomectomy, right salpingectomy, anterior repair, USS, cysto and skin tag removal. Doing well.     1. Neuro: Analgesia PRN. acetaminophen   Tablet .. 975 milliGRAM(s) Oral every 6 hours  ketorolac   Injectable 15 milliGRAM(s) IV Push every 8 hours  oxyCODONE    IR 5 milliGRAM(s) Oral every 4 hours PRN  2. Card: Monitor VS. CBC in AM. amLODIPine   Tablet 2.5 milliGRAM(s) Oral daily  hydrochlorothiazide 12.5 milliGRAM(s) Oral daily  losartan 50 milliGRAM(s) Oral daily  3. Pulm: Incentive spirometer use.   4. GI: Advance to regular diet. Anti-emetics PRN.  5. : Melendez to gravity. TOV in AM.  6. Electrolytes: LR@75cc/hr.  7. DVT ppx w/ PAS while in bed. Early ambulation, initially with assistance then as tolerated.  8. Discharge from PACU when criteria met.   d/w GYN team.  LIZZETTE Mills

## 2020-01-28 NOTE — DISCHARGE NOTE PROVIDER - NSDCMRMEDTOKEN_GEN_ALL_CORE_FT
amLODIPine 2.5 mg oral tablet: 1 tab(s) orally once a day  aspirin 81 mg oral tablet: 1 tab(s) orally once a day  atorvastatin 10 mg oral tablet: 1 tab(s) orally once a day  losartan-hydrochlorothiazide 50mg-12.5mg oral tablet: 1 tab(s) orally once a day  Vitamin D3 2000 intl units (50 mcg) oral tablet: 1 tab(s) orally once a day acetaminophen 325 mg oral tablet: 3 tab(s) orally every 6 hours  amLODIPine 2.5 mg oral tablet: 1 tab(s) orally once a day  aspirin 81 mg oral tablet: 1 tab(s) orally once a day  atorvastatin 10 mg oral tablet: 1 tab(s) orally once a day  ibuprofen 200 mg oral tablet: 3 tab(s) orally every 6 hours, As Needed  losartan-hydrochlorothiazide 50mg-12.5mg oral tablet: 1 tab(s) orally once a day  oxyCODONE 5 mg oral tablet: 1 tab(s) orally every 6 hours, As Needed -for severe pain MDD:4   Vitamin D3 2000 intl units (50 mcg) oral tablet: 1 tab(s) orally once a day

## 2020-01-28 NOTE — BRIEF OPERATIVE NOTE - NSICDXBRIEFPOSTOP_GEN_ALL_CORE_FT
POST-OP DIAGNOSIS:  Skin tag of labia 28-Jan-2020 12:30:14  Iveth Medina  Midline cystocele 28-Jan-2020 12:30:05  Iveth Medina  Incomplete uterovaginal prolapse 28-Jan-2020 12:29:59  Iveth Medina

## 2020-01-28 NOTE — BRIEF OPERATIVE NOTE - NSICDXBRIEFPREOP_GEN_ALL_CORE_FT
PRE-OP DIAGNOSIS:  Skin tag of labia 28-Jan-2020 12:29:44  Iveth Medina  Cystocele, midline 28-Jan-2020 12:29:35  Iveth Medina  Incomplete uterovaginal prolapse 28-Jan-2020 12:29:29  Iveth Medina

## 2020-01-28 NOTE — DISCHARGE NOTE PROVIDER - HOSPITAL COURSE
Patient had uncomplicated Total Vaginal Hysterectomy with Right Salpingectomy, Anterior Colporrhaphy, Uterosacral Suspension, Cystoscopy, and excision of right labial skin tag. Please see operative note for details.  During postoperative course patient's vitals were stable, vaginal bleeding appropriate, and pain well controlled.  Post operation day one hematocrit was _________.  On day of discharge patient was ambulating, her pain controlled with oral medications, had adequate oral intake, and was voiding freely.  Discharge instructions and precautions were given.  Will have close follow up with Dr. Plunkett. Patient had uncomplicated Total Vaginal Hysterectomy with Right Salpingectomy, Anterior Colporrhaphy, Uterosacral Suspension, Cystoscopy, and excision of right labial skin tag. Please see operative note for details.  During postoperative course patient's vitals were stable, vaginal bleeding appropriate, and pain well controlled.  Post operation day one hematocrit was 34.5.  On day of discharge patient was ambulating, her pain controlled with oral medications, had adequate oral intake, and was voiding freely.  Discharge instructions and precautions were given.  Will have close follow up with Dr. Plunkett.

## 2020-01-28 NOTE — DISCHARGE NOTE PROVIDER - NSDCFUADDINST_GEN_ALL_CORE_FT
No heavy lifting for six weeks. Nothing per vagina: no intercourse, tampons or douching.  Call your provider if you experience fevers, chills, worsening abdominal pain, inability to urinate or worsening vaginal bleeding.    Follow up with your provider in 2 weeks.

## 2020-01-28 NOTE — BRIEF OPERATIVE NOTE - OPERATION/FINDINGS
STage 2 prolapse, 8cm fibroid uterus, dense adhsions between small bowel and posterior cul de sac, normal bladder mucosa, UOs patent

## 2020-01-28 NOTE — BRIEF OPERATIVE NOTE - NSICDXBRIEFPROCEDURE_GEN_ALL_CORE_FT
PROCEDURES:  Excision of skin tag 28-Jan-2020 12:29:05 On right labia Iveth Medina  Anterior colporrhaphy 28-Jan-2020 12:28:53  Iveth Medina  Fixation, ligament, uterosacral 28-Jan-2020 12:28:44  Iveth Medina  Total vaginal hysterectomy with cystoscopy 28-Jan-2020 12:28:30  Iveth Medina

## 2020-01-28 NOTE — DISCHARGE NOTE PROVIDER - NSDCCPTREATMENT_GEN_ALL_CORE_FT
PRINCIPAL PROCEDURE  Procedure: Total vaginal hysterectomy with cystoscopy  Findings and Treatment:       SECONDARY PROCEDURE  Procedure: Excision of skin tag  Findings and Treatment: On right labia    Procedure: Fixation, ligament, uterosacral  Findings and Treatment:     Procedure: Anterior colporrhaphy  Findings and Treatment:

## 2020-01-28 NOTE — DISCHARGE NOTE PROVIDER - CARE PROVIDER_API CALL
Mg Plunkett)  Female Pelvic MedReconst Surg; Obstetrics and Gynecology  [address] [facility], [facility] [address]  [place] [address]  Phone: [phone]  Fax: [phone]  Established Patient  Follow Up Time: 2 weeks

## 2020-01-28 NOTE — DISCHARGE NOTE PROVIDER - NSDCCPCAREPLAN_GEN_ALL_CORE_FT
PRINCIPAL DISCHARGE DIAGNOSIS  Diagnosis: Incomplete uterovaginal prolapse  Assessment and Plan of Treatment:

## 2020-01-28 NOTE — DISCHARGE NOTE PROVIDER - NSDCFUSCHEDAPPT_GEN_ALL_CORE_FT
JUSTYN ODEN ; 02/12/2020 ; [facility] [address] JUSTNY ODEN ; 02/12/2020 ; [facility] [address]

## 2020-01-29 ENCOUNTER — TRANSCRIPTION ENCOUNTER (OUTPATIENT)
Age: 75
End: 2020-01-29

## 2020-01-29 VITALS
DIASTOLIC BLOOD PRESSURE: 63 MMHG | TEMPERATURE: 98 F | RESPIRATION RATE: 18 BRPM | HEART RATE: 63 BPM | SYSTOLIC BLOOD PRESSURE: 131 MMHG | OXYGEN SATURATION: 94 %

## 2020-01-29 LAB
BASOPHILS # BLD AUTO: 0.01 K/UL — SIGNIFICANT CHANGE UP (ref 0–0.2)
BASOPHILS NFR BLD AUTO: 0.1 % — SIGNIFICANT CHANGE UP (ref 0–2)
EOSINOPHIL # BLD AUTO: 0.01 K/UL — SIGNIFICANT CHANGE UP (ref 0–0.5)
EOSINOPHIL NFR BLD AUTO: 0.1 % — SIGNIFICANT CHANGE UP (ref 0–6)
HCT VFR BLD CALC: 34.5 % — SIGNIFICANT CHANGE UP (ref 34.5–45)
HCT VFR BLD CALC: 38.4 % — SIGNIFICANT CHANGE UP (ref 34.5–45)
HGB BLD-MCNC: 10.9 G/DL — LOW (ref 11.5–15.5)
HGB BLD-MCNC: 12.3 G/DL — SIGNIFICANT CHANGE UP (ref 11.5–15.5)
IMM GRANULOCYTES NFR BLD AUTO: 0.3 % — SIGNIFICANT CHANGE UP (ref 0–1.5)
LYMPHOCYTES # BLD AUTO: 1.79 K/UL — SIGNIFICANT CHANGE UP (ref 1–3.3)
LYMPHOCYTES # BLD AUTO: 18.3 % — SIGNIFICANT CHANGE UP (ref 13–44)
MCHC RBC-ENTMCNC: 30.5 PG — SIGNIFICANT CHANGE UP (ref 27–34)
MCHC RBC-ENTMCNC: 31.6 GM/DL — LOW (ref 32–36)
MCV RBC AUTO: 96.6 FL — SIGNIFICANT CHANGE UP (ref 80–100)
MONOCYTES # BLD AUTO: 0.92 K/UL — HIGH (ref 0–0.9)
MONOCYTES NFR BLD AUTO: 9.4 % — SIGNIFICANT CHANGE UP (ref 2–14)
NEUTROPHILS # BLD AUTO: 7 K/UL — SIGNIFICANT CHANGE UP (ref 1.8–7.4)
NEUTROPHILS NFR BLD AUTO: 71.8 % — SIGNIFICANT CHANGE UP (ref 43–77)
NRBC # BLD: 0 /100 WBCS — SIGNIFICANT CHANGE UP (ref 0–0)
PLATELET # BLD AUTO: 185 K/UL — SIGNIFICANT CHANGE UP (ref 150–400)
RBC # BLD: 3.57 M/UL — LOW (ref 3.8–5.2)
RBC # FLD: 13.1 % — SIGNIFICANT CHANGE UP (ref 10.3–14.5)
WBC # BLD: 9.76 K/UL — SIGNIFICANT CHANGE UP (ref 3.8–10.5)
WBC # FLD AUTO: 9.76 K/UL — SIGNIFICANT CHANGE UP (ref 3.8–10.5)

## 2020-01-29 PROCEDURE — 88302 TISSUE EXAM BY PATHOLOGIST: CPT

## 2020-01-29 PROCEDURE — 82962 GLUCOSE BLOOD TEST: CPT

## 2020-01-29 PROCEDURE — 85018 HEMOGLOBIN: CPT

## 2020-01-29 PROCEDURE — 88305 TISSUE EXAM BY PATHOLOGIST: CPT

## 2020-01-29 PROCEDURE — 85014 HEMATOCRIT: CPT

## 2020-01-29 PROCEDURE — 85027 COMPLETE CBC AUTOMATED: CPT

## 2020-01-29 RX ORDER — IBUPROFEN 200 MG
600 TABLET ORAL EVERY 6 HOURS
Refills: 0 | Status: DISCONTINUED | OUTPATIENT
Start: 2020-01-29 | End: 2020-01-29

## 2020-01-29 RX ADMIN — AMLODIPINE BESYLATE 2.5 MILLIGRAM(S): 2.5 TABLET ORAL at 06:12

## 2020-01-29 RX ADMIN — ATORVASTATIN CALCIUM 10 MILLIGRAM(S): 80 TABLET, FILM COATED ORAL at 05:59

## 2020-01-29 RX ADMIN — Medication 975 MILLIGRAM(S): at 00:43

## 2020-01-29 RX ADMIN — Medication 600 MILLIGRAM(S): at 13:20

## 2020-01-29 RX ADMIN — Medication 975 MILLIGRAM(S): at 09:21

## 2020-01-29 RX ADMIN — Medication 12.5 MILLIGRAM(S): at 05:59

## 2020-01-29 RX ADMIN — Medication 600 MILLIGRAM(S): at 12:50

## 2020-01-29 RX ADMIN — LOSARTAN POTASSIUM 50 MILLIGRAM(S): 100 TABLET, FILM COATED ORAL at 05:59

## 2020-01-29 RX ADMIN — Medication 15 MILLIGRAM(S): at 05:59

## 2020-01-29 RX ADMIN — Medication 975 MILLIGRAM(S): at 01:15

## 2020-01-29 RX ADMIN — Medication 15 MILLIGRAM(S): at 06:30

## 2020-01-29 RX ADMIN — Medication 975 MILLIGRAM(S): at 09:51

## 2020-01-29 NOTE — DISCHARGE NOTE NURSING/CASE MANAGEMENT/SOCIAL WORK - NSDCPNINST_GEN_ALL_CORE
Notify md for s/s of infection, fever, increased pain, increased vaginal bleeding, nausea, vomitting, or increased swelling.  No heavy lifting, bending, driving x 2 weeks.  No intercourse, or tub baths

## 2020-01-29 NOTE — PROGRESS NOTE ADULT - ASSESSMENT
A/P: 74y POD#1 s/p TVH w/ myomectomy, right salpingectomy, anterior repair, USS, cysto and skin tag removal. Patient is stable and doing well.      Neuro: Pain well controlled. Continue toradol and PO Tylenol, oxy prn.   CV: Hemodynamically stable. AM CBC reviewed and wnl. Continue home BP medications.  Pulm: Saturating well on room air, encourage oob/amb.  GI: Continue regular diet.  : UOP adequate, will d/c melendez with vaginal packing and perform trial of void.  Heme: SCDs and early ambulation for DVT ppx.  FEN: LR@75.  ID: Afebrile.  Endo: No active issues.   Dispo: Continue routine post-op care. Will plan for discharge today.    Ileana Francis PGY-2 Pt.A/P: 74y POD#1 s/p TVH w/ myomectomy, right salpingectomy, anterior repair, USS, cysto and skin tag removal. Patient is stable and doing well.      Neuro: Pain well controlled. Continue toradol and PO Tylenol, oxy prn.   CV: Hemodynamically stable. AM CBC reviewed and wnl. Continue home BP medications.  Pulm: Saturating well on room air, encourage oob/amb.  GI: Continue regular diet.  : UOP adequate, will d/c melendez with vaginal packing and perform trial of void.  Heme: SCDs and early ambulation for DVT ppx.  FEN: LR@75.  ID: Afebrile.  Endo: No active issues.   Dispo: Continue routine post-op care. Will plan for discharge today.    Ileana Francis PGY-2    Urogyn fellow Note  Pt. seen and examined. I agree with residents A/P.  Vaginal packing removed with scant staining.   Hct decrease 45.3 to 34.5.   Vital signs stable, UOP adequate.   TOV today.   Anticipate d/c home.     Adam, PGY7

## 2020-01-29 NOTE — PROGRESS NOTE ADULT - SUBJECTIVE AND OBJECTIVE BOX
ABHAY GUNTER Progress Note: POD#1  HD#2    Patient seen and examined at bedside.  No acute events overnight. No acute complaints.  Pain well controlled.   Patient is ambulating and tolerating PO. Patient is passing flatus. Melendez is still in place. Denies CP, SOB, N/V, fevers, and chills.    Vital Signs Last 24 Hours  T(C): 36.8 (01-29-20 @ 06:00), Max: 37 (01-28-20 @ 16:59)  HR: 59 (01-29-20 @ 06:00) (59 - 90)  BP: 124/58 (01-29-20 @ 06:00) (124/58 - 164/94)  RR: 18 (01-29-20 @ 06:00) (16 - 20)  SpO2: 97% (01-29-20 @ 06:00) (95% - 100%)    I&O's Summary    28 Jan 2020 07:01  -  29 Jan 2020 07:00  --------------------------------------------------------  IN: 1015 mL / OUT: 2775 mL / NET: -1760 mL      Physical Exam:  General: NAD  CV: RR, S1, S2  Lungs: CTA b/l  Abdomen: Soft, NT, ND  : no bleeding, vaginal packing in place and not soaked; melendez in situ  Ext: No LE edema/tenderness    Labs:                        10.9   9.76  )-----------( 185      ( 29 Jan 2020 06:41 )             34.5   baso 0.1    eos 0.1    imm gran 0.3    lymph 18.3   mono 9.4    poly 71.8       MEDICATIONS  (STANDING):  acetaminophen   Tablet .. 975 milliGRAM(s) Oral every 6 hours  amLODIPine   Tablet 2.5 milliGRAM(s) Oral daily  atorvastatin 10 milliGRAM(s) Oral daily  cefoTEtan  IVPB 2 Gram(s) IV Intermittent once  hydrochlorothiazide 12.5 milliGRAM(s) Oral daily  ibuprofen  Tablet. 600 milliGRAM(s) Oral every 6 hours  lactated ringers. 1000 milliLiter(s) (75 mL/Hr) IV Continuous <Continuous>  losartan 50 milliGRAM(s) Oral daily    MEDICATIONS  (PRN):  oxyCODONE    IR 5 milliGRAM(s) Oral every 4 hours PRN Severe Pain (7 - 10)

## 2020-01-29 NOTE — DISCHARGE NOTE NURSING/CASE MANAGEMENT/SOCIAL WORK - PATIENT PORTAL LINK FT
You can access the FollowMyHealth Patient Portal offered by [facility] by registering at the following website: [email or web address]. By joining [employer]’s FollowMyHealth portal, you will also be able to view your health information using other applications (apps) compatible with our system.

## 2020-01-29 NOTE — CHART NOTE - NSCHARTNOTEFT_GEN_A_CORE
TOV Note    Active Trial of Void performed.   300cc NS instilled into the bladder by gravity.  Patient assisted to bathroom  Celestin D/C'd  Pt voided  300cc   Celestin catheter to remain out.          H&H pending

## 2020-02-07 LAB — SURGICAL PATHOLOGY STUDY: SIGNIFICANT CHANGE UP

## 2020-02-12 ENCOUNTER — APPOINTMENT (OUTPATIENT)
Dept: UROGYNECOLOGY | Facility: CLINIC | Age: 75
End: 2020-02-12
Payer: MEDICARE

## 2020-02-12 PROBLEM — N81.89 OTHER FEMALE GENITAL PROLAPSE: Chronic | Status: ACTIVE | Noted: 2020-01-15

## 2020-02-12 PROBLEM — N36.41 HYPERMOBILITY OF URETHRA: Chronic | Status: ACTIVE | Noted: 2020-01-15

## 2020-02-12 PROBLEM — R39.15 URGENCY OF URINATION: Chronic | Status: ACTIVE | Noted: 2020-01-15

## 2020-02-12 PROBLEM — E78.5 HYPERLIPIDEMIA, UNSPECIFIED: Chronic | Status: ACTIVE | Noted: 2020-01-15

## 2020-02-12 PROBLEM — M70.61 TROCHANTERIC BURSITIS, RIGHT HIP: Chronic | Status: ACTIVE | Noted: 2020-01-15

## 2020-02-12 PROBLEM — M51.26 OTHER INTERVERTEBRAL DISC DISPLACEMENT, LUMBAR REGION: Chronic | Status: ACTIVE | Noted: 2020-01-15

## 2020-02-12 PROBLEM — N81.11 CYSTOCELE, MIDLINE: Chronic | Status: ACTIVE | Noted: 2020-01-15

## 2020-02-12 PROBLEM — N81.2 INCOMPLETE UTEROVAGINAL PROLAPSE: Chronic | Status: ACTIVE | Noted: 2020-01-15

## 2020-02-12 PROBLEM — I10 ESSENTIAL (PRIMARY) HYPERTENSION: Chronic | Status: ACTIVE | Noted: 2020-01-15

## 2020-02-12 PROBLEM — N39.3 STRESS INCONTINENCE (FEMALE) (MALE): Chronic | Status: ACTIVE | Noted: 2020-01-15

## 2020-02-12 PROBLEM — Z86.018 PERSONAL HISTORY OF OTHER BENIGN NEOPLASM: Chronic | Status: ACTIVE | Noted: 2020-01-15

## 2020-02-12 PROCEDURE — 99024 POSTOP FOLLOW-UP VISIT: CPT | Mod: GC

## 2020-02-12 NOTE — OBJECTIVE
[Clean, Dry, Intact] : Clean, Dry, Intact [Soft and Nontender] : soft and nontender [Healing well] : healing well [Good Support] : Good support [No Masses or Tenderness] : no masses or tenderness

## 2020-02-12 NOTE — DISCUSSION/SUMMARY
[Post-Op instructions given. Pt/family verbalizes understanding] : post-operative instructions were provided to the patient/family who verbalize understanding [Risks/Benefits discussed. Pt/family verbalizes understanding] : risks and benefits of the procedure were discussed with the patient/family who verbalize understanding [FreeTextEntry1] : \par 75yo s/p TVH, USLS, anterior repair, cysto and removal of right labial skin tag. She is ~ 3 weeks from surgery. She is very happy with the results. We discussed restrictions. She will follow up in 1 months.

## 2020-02-12 NOTE — SUBJECTIVE
[FreeTextEntry1] : NAD [FreeTextEntry8] : None [FreeTextEntry7] : None [FreeTextEntry6] : Normal [FreeTextEntry4] : Denies constipation [FreeTextEntry5] : Denies urgency, frequency, UUI, or GISELL [FreeTextEntry3] : Normal [FreeTextEntry2] : Normal

## 2020-03-16 ENCOUNTER — APPOINTMENT (OUTPATIENT)
Dept: UROGYNECOLOGY | Facility: CLINIC | Age: 75
End: 2020-03-16
Payer: MEDICARE

## 2020-03-16 DIAGNOSIS — Z98.890 OTHER SPECIFIED POSTPROCEDURAL STATES: ICD-10-CM

## 2020-03-16 DIAGNOSIS — N81.2 INCOMPLETE UTEROVAGINAL PROLAPSE: ICD-10-CM

## 2020-03-16 DIAGNOSIS — N81.11 CYSTOCELE, MIDLINE: ICD-10-CM

## 2020-03-16 DIAGNOSIS — Z87.42 PERSONAL HISTORY OF OTHER DISEASES OF THE FEMALE GENITAL TRACT: ICD-10-CM

## 2020-03-16 DIAGNOSIS — N36.41 HYPERMOBILITY OF URETHRA: ICD-10-CM

## 2020-03-16 DIAGNOSIS — N39.3 STRESS INCONTINENCE (FEMALE) (MALE): ICD-10-CM

## 2020-03-16 PROCEDURE — 99024 POSTOP FOLLOW-UP VISIT: CPT

## 2020-03-16 NOTE — DISCUSSION/SUMMARY
[Post-Op instructions given. Pt/family verbalizes understanding] : post-operative instructions were provided to the patient/family who verbalize understanding [Risks/Benefits discussed. Pt/family verbalizes understanding] : risks and benefits of the procedure were discussed with the patient/family who verbalize understanding [FreeTextEntry1] : 75 yo s/p total vaginal hysterectomy, right salpingectomy, uterosacral ligament suspension, anterior repair, cystoscopy and excision of  the skin tag on the right labia on 1/28/20.  She is recovering well and meeting all post operative milestones.  Patient can resume vaginal intercourse and normal activities.  All questions answered, to return for follow up in 4 weeks.

## 2020-03-16 NOTE — SUBJECTIVE
[FreeTextEntry1] : Recovering well. [FreeTextEntry8] : None [FreeTextEntry7] : None, controlled without PO pain medication [FreeTextEntry6] : Normal [FreeTextEntry5] : Denies incontinence, no urgency, frequency [FreeTextEntry4] : Denies constipation [FreeTextEntry3] : Normal [FreeTextEntry2] : Normal

## 2020-03-16 NOTE — OBJECTIVE
[Soft and Nontender] : soft and nontender [Clean, Dry, Intact] : Clean, Dry, Intact [Good Support] : Good support [Healing well] : healing well [No Masses or Tenderness] : no masses or tenderness [Post Void Residual ____ ml] : Post Void Residual was [unfilled] ml [FreeTextEntry3] : sutures noted at cuff to be long, trimmed, cuff intact

## 2020-06-17 ENCOUNTER — APPOINTMENT (OUTPATIENT)
Dept: UROGYNECOLOGY | Facility: CLINIC | Age: 75
End: 2020-06-17
Payer: MEDICARE

## 2020-06-17 DIAGNOSIS — R39.15 URGENCY OF URINATION: ICD-10-CM

## 2020-06-17 PROCEDURE — 99213 OFFICE O/P EST LOW 20 MIN: CPT

## 2020-06-17 NOTE — DISCUSSION/SUMMARY
[FreeTextEntry1] : Regarding urgency, pt was informed of BMT/dietary modifications that can help improve urgency.  She was advised that she can decrease the amount of tea she drinks to one cup daily.  She also will try to cut down other bladder irritants such as alcohol from diet.  \par \par Otherwise, she is very happy with surgery.  She was advised to f/u with GYN for annual exams and can return to this office as needed. Pt agrees to call office with any problems/concerns.

## 2020-06-17 NOTE — PHYSICAL EXAM
[No Acute Distress] : in no acute distress [Good Hygeine] : demonstrates good hygeine [Well developed] : well developed [Well Nourished] : ~L well nourished [Oriented x3] : oriented to person, place, and time [Normal Memory] : ~T memory was ~L unimpaired [Normal Mood/Affect] : mood and affect are normal [Normal Gait] : gait was normal [Warm and Dry] : was warm and dry to touch [Labia Majora] : were normal [Labia Minora] : were normal [No Bleeding] : there was no active vaginal bleeding [Normal Appearance] : general appearance was normal [Normal] : was normal [Anxiety] : patient is not anxious [Distended] : not distended [Tenderness] : ~T no ~M abdominal tenderness observed

## 2020-06-17 NOTE — HISTORY OF PRESENT ILLNESS
[FreeTextEntry1] : Pt presents to office for f/u s/p TVH, right salp,  USS, Ant repair on 1/28/2020.  She denies any vaginal bulging.  Denies incontinence.  Occasional urgency but denies that it is bothersome. Nocturia 0-1 times per night. Denies constipation.  \par \par She reports drinking 4-5 cups of tea daily.

## 2021-10-28 NOTE — H&P PST ADULT - RESPIRATORY AND THORAX
details… Detail Level: Detailed Quality 226: Preventive Care And Screening: Tobacco Use: Screening And Cessation Intervention: Patient screened for tobacco use and is an ex/non-smoker

## 2023-09-07 NOTE — DISCHARGE NOTE PROVIDER - NSDCHC_MEDRECSTATUS_GEN_ALL_CORE
Admission Reconciliation is Completed  Discharge Reconciliation is Not Complete Admission Reconciliation is Completed  Discharge Reconciliation is Completed Infliximab Counseling:  I discussed with the patient the risks of infliximab including but not limited to myelosuppression, immunosuppression, autoimmune hepatitis, demyelinating diseases, lymphoma, and serious infections.  The patient understands that monitoring is required including a PPD at baseline and must alert us or the primary physician if symptoms of infection or other concerning signs are noted.

## 2024-04-01 ENCOUNTER — APPOINTMENT (OUTPATIENT)
Dept: MRI IMAGING | Facility: CLINIC | Age: 79
End: 2024-04-01
Payer: MEDICARE

## 2024-04-01 ENCOUNTER — APPOINTMENT (OUTPATIENT)
Dept: ORTHOPEDIC SURGERY | Facility: CLINIC | Age: 79
End: 2024-04-01
Payer: MEDICARE

## 2024-04-01 VITALS — WEIGHT: 141 LBS | BODY MASS INDEX: 26.62 KG/M2 | HEIGHT: 61 IN

## 2024-04-01 PROCEDURE — 73030 X-RAY EXAM OF SHOULDER: CPT | Mod: RT

## 2024-04-01 PROCEDURE — 73010 X-RAY EXAM OF SHOULDER BLADE: CPT | Mod: RT

## 2024-04-01 PROCEDURE — 73221 MRI JOINT UPR EXTREM W/O DYE: CPT | Mod: RT,MH

## 2024-04-01 PROCEDURE — 99204 OFFICE O/P NEW MOD 45 MIN: CPT

## 2024-04-01 RX ORDER — METHYLPREDNISOLONE 4 MG/1
4 TABLET ORAL
Qty: 1 | Refills: 0 | Status: ACTIVE | COMMUNITY
Start: 2024-04-01 | End: 1900-01-01

## 2024-04-01 NOTE — PHYSICAL EXAM
[Right] : right shoulder [Sitting] : sitting [Moderate] : moderate [Severe] : severe [3 ___] : forward flexion 3[unfilled]/5 [3___] : external rotation 3[unfilled]/5 [] : no sensory deficits [TWNoteComboBox7] : active forward flexion 30 degrees [TWNoteComboBox4] : passive forward flexion 140 degrees [de-identified] : external rotation 30 degrees

## 2024-04-01 NOTE — DISCHARGE NOTE NURSING/CASE MANAGEMENT/SOCIAL WORK - NSTRANSFERBELONGINGSDISPO_GEN_A_NUR
Pt is seen for tre-operative risk stratification.     Patient reports no sx of chest pain at rest or w/ exertion, dyspnea at rest or w/ exertion, PND, LE edema, claudication, and palpitations. Patient has no history of CHF, ischemic heart disease, CVD, DM II, alcohol or drug abuse, recent anticoagulation or antithrombotic use, personal or FHx of coagulopathy or CKD. Patient reports being able to achieve > 4 METs of activity (can walk a flight of stairs and several blocks) without any cardiac symptoms. Has personal hx of surgery undergoing general anesthesia and tolerated without issues. Has post-op support.    EKG done previously was normal.   Labs ordered and WNL to proceed     Patient is aware of the risks and benefits of surgery and patient wishes to proceed with operation. Pt is currently medically optimized and considered acceptable risk / low risk for surgery.     Will fax preoperative H&P to surgeon/ referring provider.      given to family

## 2024-04-01 NOTE — ASSESSMENT
[FreeTextEntry1] : We reviewed the findings and the history. Questions were answered and concerns addressed. The options were outlined. A STAT MRI is planned. MDP is prescribed.   Patient was seen by Dr. Gómez Benitez. Patient was seen by Rosa Isela ALMANZA under the supervision of Dr. Gómez Benitez. Progress note was completed by Rosa Isela ALMANZA. Entered by Daniela Olivera acting as scribe.

## 2024-04-01 NOTE — CONSULT LETTER
[Consult Letter:] : I had the pleasure of evaluating your patient, [unfilled]. [Dear  ___] : Dear  [unfilled], [Please see my note below.] : Please see my note below. [Consult Closing:] : Thank you very much for allowing me to participate in the care of this patient.  If you have any questions, please do not hesitate to contact me. [Sincerely,] : Sincerely, [FreeTextEntry3] : Gómez Benitez M.D. Shoulder Surgery

## 2024-04-01 NOTE — HISTORY OF PRESENT ILLNESS
[de-identified] : 79yo RHD F here for right shoulder pain that began after falling her right shoulder on 3/26/24. Went to [facility] on 3/29/24, had XR-negative Is wearing sling. No prior hx.  Pt is a caretaker for her .

## 2024-04-01 NOTE — IMAGING
[Right] : right shoulder [FreeTextEntry1] : The GH is OK.  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  There are HH cysts. [FreeTextEntry5] : There is a Type II acromion.

## 2024-04-01 NOTE — REASON FOR VISIT
[FreeTextEntry2] : This is a 78 year old RHD F with right shoulder pain after a fall with a direct blow on 3/26/24.  She had a fall in 2022 with bruising in the right arm, but no subsequent symptoms.  She went to [facility] and had negative XRs.  She was given a sling for pain.  She is taking OTC medications.  No numbness.  Sleeping and reaching are both very difficult.  She cares for her .

## 2024-04-05 ENCOUNTER — APPOINTMENT (OUTPATIENT)
Dept: ORTHOPEDIC SURGERY | Facility: CLINIC | Age: 79
End: 2024-04-05
Payer: MEDICARE

## 2024-04-05 VITALS — WEIGHT: 141 LBS | BODY MASS INDEX: 26.62 KG/M2 | HEIGHT: 61 IN

## 2024-04-05 PROCEDURE — 99214 OFFICE O/P EST MOD 30 MIN: CPT

## 2024-04-05 NOTE — DATA REVIEWED
[FreeTextEntry1] : MRI R SHOULDER [facility]: Large posterosuperior cuff tear. Good muscle. Biceps is perched into subscapularis tear. Effusion present. There are AC changes.

## 2024-04-05 NOTE — REASON FOR VISIT
[FreeTextEntry2] : This is a 78 year old RHD F with right shoulder pain after a fall with a direct blow on 3/26/24.  She had a fall in 2022 with bruising in the right arm, but no subsequent symptoms.  She went to [facility] and had negative XRs.  She was given a sling for pain.  She is taking OTC medications.  No numbness.  Sleeping and reaching are both very difficult.  She cares for her .

## 2024-04-05 NOTE — PHYSICAL EXAM
[Right] : right shoulder [Sitting] : sitting [Severe] : severe [Moderate] : moderate [3 ___] : forward flexion 3[unfilled]/5 [3___] : external rotation 3[unfilled]/5 [] : no sensory deficits [TWNoteComboBox7] : active forward flexion 30 degrees [TWNoteComboBox4] : passive forward flexion 140 degrees [de-identified] : external rotation 30 degrees

## 2024-04-05 NOTE — ASSESSMENT
[FreeTextEntry1] : We discussed the underlying pathology.  Reviewed MRI results.  Treatment options reviewed.  NSAIDs/ Tylenol discussed.  Will have her start PT.  Follow up in 3-4 weeks.  Cautions discussed.  Questions answered.  Patient seen by Gómez Benitez M.D. Entered by Lainey Win, acting as scribe.

## 2024-05-24 ENCOUNTER — APPOINTMENT (OUTPATIENT)
Dept: ORTHOPEDIC SURGERY | Facility: CLINIC | Age: 79
End: 2024-05-24
Payer: MEDICARE

## 2024-05-24 DIAGNOSIS — S46.891D OTHER INJURY OF OTHER MUSCLES, FASCIA AND TENDONS AT SHOULDER AND UPPER ARM LEVEL, RIGHT ARM, SUBSEQUENT ENCOUNTER: ICD-10-CM

## 2024-05-24 DIAGNOSIS — S43.003A UNSPECIFIED SUBLUXATION OF UNSPECIFIED SHOULDER JOINT, INITIAL ENCOUNTER: ICD-10-CM

## 2024-05-24 DIAGNOSIS — M75.121 COMPLETE ROTATOR CUFF TEAR OR RUPTURE OF RIGHT SHOULDER, NOT SPECIFIED AS TRAUMATIC: ICD-10-CM

## 2024-05-24 PROCEDURE — 99214 OFFICE O/P EST MOD 30 MIN: CPT

## 2024-05-24 NOTE — ASSESSMENT
[FreeTextEntry1] : She is doing much better. PT will continue. Caution advised. Questions answered.  Patient was seen by Dr. Gómez Benitez. Patient was seen by Rosa Isela ALMANZA under the supervision of Dr. Gómez Benitez. Progress note was completed by Rosa Isela ALMANZA.

## 2024-05-24 NOTE — PHYSICAL EXAM
[Right] : right shoulder [3 ___] : forward flexion 3[unfilled]/5 [3___] : external rotation 3[unfilled]/5 [Standing] : standing [Minimal] : minimal [] : no sensory deficits [TWNoteComboBox7] : active forward flexion 135 degrees [TWNoteComboBox4] : passive forward flexion 150 degrees [de-identified] : external rotation 45 degrees

## 2024-05-24 NOTE — REASON FOR VISIT
[FreeTextEntry2] : This is a 79 year old RHD F with right shoulder pain after a fall with a direct blow on 3/26/24.  She had a fall in 2022 with bruising in the right arm, but no subsequent symptoms.  She went to [facility] and had negative XRs.  She was given a sling for pain.  She is taking OTC medications.  No numbness.  She has made very nice gains with therapy.  She is much happier.  She is actively participating in her HEP.

## 2024-08-15 NOTE — H&P PST ADULT - WEIGHT IN LBS
134.9